# Patient Record
(demographics unavailable — no encounter records)

---

## 2024-12-19 NOTE — ADDENDUM
[FreeTextEntry1] : 44 y/o M, Sami-speaking, w/ no known PMHx who presents as an initial evaluation s/p hospital discharge. Pt was seen at [facility]-ED for dizziness, found to have new pituitary mass on MRI.  He was seen on the day of discharge from the hospital, labs were consistent with hyperprolactinemia and he was started on cabergoline here for follow-up  # Pituitary mass -Noted during evaluation for stroke, followed by MRI of brain which showed a possible 2 cm enhancing cystic mass from the sella turcica - -Prolactin 786, TSH within normal limits Free T4 low, FSH low testosterone low, IGF low, ACTH and cortisol WNL  - - Was started on cabergoline 0.5 mg twice a week has been taking it on Monday and Tuesday advised to space them out to decrease potential side effects, can take them at bedtime, currently tolerating well -Denied any symptoms of hypo or hyper secretion of hormones -Will repeat prolactin FSH LH testosterone TSH free T4 ACTH cortisol IGF-I levels in 2 weeks -Also recommend to get a dedicated MRI of the pituitary gland, -Bedside ophthalmology evaluation noted to be within normal limits will refer for formal visual field exam  #Type 2 diabetes mellitus -Diagnosed during recent hospitalization with A1c 6.5 -Currently on metformin 500 mg twice a day tolerating the same

## 2024-12-19 NOTE — ADDENDUM
[FreeTextEntry1] : 44 y/o M, Turkmen-speaking, w/ no known PMHx who presents as an initial evaluation s/p hospital discharge. Pt was seen at [facility]-ED for dizziness, found to have new pituitary mass on MRI.  He was seen on the day of discharge from the hospital, labs were consistent with hyperprolactinemia and he was started on cabergoline here for follow-up  # Pituitary mass -Noted during evaluation for stroke, followed by MRI of brain which showed a possible 2 cm enhancing cystic mass from the sella turcica - -Prolactin 786, TSH within normal limits Free T4 low, FSH low testosterone low, IGF low, ACTH and cortisol WNL  - - Was started on cabergoline 0.5 mg twice a week has been taking it on Monday and Tuesday advised to space them out to decrease potential side effects, can take them at bedtime, currently tolerating well -Denied any symptoms of hypo or hyper secretion of hormones -Will repeat prolactin FSH LH testosterone TSH free T4 ACTH cortisol IGF-I levels in 2 weeks -Also recommend to get a dedicated MRI of the pituitary gland, -Bedside ophthalmology evaluation noted to be within normal limits will refer for formal visual field exam  #Type 2 diabetes mellitus -Diagnosed during recent hospitalization with A1c 6.5 -Currently on metformin 500 mg twice a day tolerating the same

## 2024-12-19 NOTE — ADDENDUM
[FreeTextEntry1] : 44 y/o M, Greek-speaking, w/ no known PMHx who presents as an initial evaluation s/p hospital discharge. Pt was seen at [facility]-ED for dizziness, found to have new pituitary mass on MRI.  He was seen on the day of discharge from the hospital, labs were consistent with hyperprolactinemia and he was started on cabergoline here for follow-up  # Pituitary mass -Noted during evaluation for stroke, followed by MRI of brain which showed a possible 2 cm enhancing cystic mass from the sella turcica - -Prolactin 786, TSH within normal limits Free T4 low, FSH low testosterone low, IGF low, ACTH and cortisol WNL  - - Was started on cabergoline 0.5 mg twice a week has been taking it on Monday and Tuesday advised to space them out to decrease potential side effects, can take them at bedtime, currently tolerating well -Denied any symptoms of hypo or hyper secretion of hormones -Will repeat prolactin FSH LH testosterone TSH free T4 ACTH cortisol IGF-I levels in 2 weeks -Also recommend to get a dedicated MRI of the pituitary gland, -Bedside ophthalmology evaluation noted to be within normal limits will refer for formal visual field exam  #Type 2 diabetes mellitus -Diagnosed during recent hospitalization with A1c 6.5 -Currently on metformin 500 mg twice a day tolerating the same

## 2024-12-19 NOTE — ASSESSMENT
[FreeTextEntry1] : 46 y/o M, Yi-speaking, w/ no known PMHx who presents as an initial evaluation s/p hospital discharge. Pt was seen at [facility]-ED for dizziness, found to have new pituitary mass on MRI.  #Prolactinoma -Noted on CT head followed by MRI brain described as a possible 2 cm enhancing cystic mass from the sella turcica -MR Head (11/2024): No acute infarct; Possible 2 cm necrotic right pituitary mass; Otherwise essentially unremarkable study. -Prolactin 786, Total Testosterone low at 4.7 @ 1600, TSH WNL however FT4 low at 0.5, Cortisol WNL 17 @ 1600, FSH 1.4, LH 1 (11/2024) -Clinically no signs of hormonal hyposecretion; denies weight loss, nausea, vomiting, low sex drive  Plan:  - Check prolactin, TSH, FT4, ACTH, cortisol, IGF-1, macroprolactin, total testosterone, free MS/EQ testosterone in 2 weeks - C/w cabergoline 0.5 mg 1 tablet twice a week (takes Mon + Tues, advised to space medications out more), started medication on 11/29/24, no SE - Check MRI of the pituitary gland  - Ophthalmology referral for visual field testing  #DMII - A1c 6.5% (11/28/24) - C/w metformin 500 mg BID, reports no SE  #RTC in 3 months

## 2024-12-19 NOTE — ASSESSMENT
[FreeTextEntry1] : 46 y/o M, Estonian-speaking, w/ no known PMHx who presents as an initial evaluation s/p hospital discharge. Pt was seen at [facility]-ED for dizziness, found to have new pituitary mass on MRI.  #Prolactinoma -Noted on CT head followed by MRI brain described as a possible 2 cm enhancing cystic mass from the sella turcica -MR Head (11/2024): No acute infarct; Possible 2 cm necrotic right pituitary mass; Otherwise essentially unremarkable study. -Prolactin 786, Total Testosterone low at 4.7 @ 1600, TSH WNL however FT4 low at 0.5, Cortisol WNL 17 @ 1600, FSH 1.4, LH 1 (11/2024) -Clinically no signs of hormonal hyposecretion; denies weight loss, nausea, vomiting, low sex drive  Plan:  - Check prolactin, TSH, FT4, ACTH, cortisol, IGF-1, macroprolactin, total testosterone, free MS/EQ testosterone in 2 weeks - C/w cabergoline 0.5 mg 1 tablet twice a week (takes Mon + Tues, advised to space medications out more), started medication on 11/29/24, no SE - Check MRI of the pituitary gland  - Ophthalmology referral for visual field testing  #DMII - A1c 6.5% (11/28/24) - C/w metformin 500 mg BID, reports no SE  #RTC in 3 months  Adequate: hears normal conversation without difficulty - - -

## 2024-12-19 NOTE — PHYSICAL EXAM
[Alert] : alert [No Acute Distress] : no acute distress [Normal Sclera/Conjunctiva] : normal sclera/conjunctiva [Normal Outer Ear/Nose] : the ears and nose were normal in appearance [No Respiratory Distress] : no respiratory distress [No Accessory Muscle Use] : no accessory muscle use [No Edema] : no peripheral edema

## 2024-12-19 NOTE — ASSESSMENT
[FreeTextEntry1] : 44 y/o M, Telugu-speaking, w/ no known PMHx who presents as an initial evaluation s/p hospital discharge. Pt was seen at [facility]-ED for dizziness, found to have new pituitary mass on MRI.  #Prolactinoma -Noted on CT head followed by MRI brain described as a possible 2 cm enhancing cystic mass from the sella turcica -MR Head (11/2024): No acute infarct; Possible 2 cm necrotic right pituitary mass; Otherwise essentially unremarkable study. -Prolactin 786, Total Testosterone low at 4.7 @ 1600, TSH WNL however FT4 low at 0.5, Cortisol WNL 17 @ 1600, FSH 1.4, LH 1 (11/2024) -Clinically no signs of hormonal hyposecretion; denies weight loss, nausea, vomiting, low sex drive  Plan:  - Check prolactin, TSH, FT4, ACTH, cortisol, IGF-1, macroprolactin, total testosterone, free MS/EQ testosterone in 2 weeks - C/w cabergoline 0.5 mg 1 tablet twice a week (takes Mon + Tues, advised to space medications out more), started medication on 11/29/24, no SE - Check MRI of the pituitary gland  - Ophthalmology referral for visual field testing  #DMII - A1c 6.5% (11/28/24) - C/w metformin 500 mg BID, reports no SE  #RTC in 3 months

## 2024-12-23 NOTE — HISTORY OF PRESENT ILLNESS
[FreeTextEntry1] : note started in error, see other note dated 12/19 [de-identified] : #Pituitary mass -Noted on CT head followed by MRI brain described as a possible 2 cm enhancing cystic mass from the sella turcica -Prolactin 786, TSH within normal limits Free T4 low, FSH low testosterone pending, IGF low, ACTH well within normal limits which is reassuring cortisol not performed -Clinically no signs of hormonal hyposecretion,denies weight loss, nausea, vomiting, low sex drive hemodynamically stable electrolytes within normal limits -Patient being discharged today recommend recommend obtaining a cortisol level, if cortisol is low may need steroid supplementation before considering thyroid hormone supplementation -Recommend starting cabergoline 0.5 mg 1 tablet twice a week -Will follow-up in 2 to 3 weeks at [facility] with repeat labs - will need dedicated MRI of the pituitary gland (Can be performed outpatient) - will likely need formal visual field testing, no obvious defect noted on bedside exam  MR Head: No acute infarct; Possible 2 cm necrotic right pituitary mass; Otherwise essentially unremarkable study.  11/28/24 A1c 6.5% 11/28/24 Total Testosterone 4.7 @ 1600 FT4 0.5 Cortisol 17 @ 1600 Prolactin 748 FSH 1.4 LH 1

## 2024-12-23 NOTE — HISTORY OF PRESENT ILLNESS
[FreeTextEntry1] : note started in error, see other note dated 12/19 [de-identified] : #Pituitary mass -Noted on CT head followed by MRI brain described as a possible 2 cm enhancing cystic mass from the sella turcica -Prolactin 786, TSH within normal limits Free T4 low, FSH low testosterone pending, IGF low, ACTH well within normal limits which is reassuring cortisol not performed -Clinically no signs of hormonal hyposecretion,denies weight loss, nausea, vomiting, low sex drive hemodynamically stable electrolytes within normal limits -Patient being discharged today recommend recommend obtaining a cortisol level, if cortisol is low may need steroid supplementation before considering thyroid hormone supplementation -Recommend starting cabergoline 0.5 mg 1 tablet twice a week -Will follow-up in 2 to 3 weeks at [facility] with repeat labs - will need dedicated MRI of the pituitary gland (Can be performed outpatient) - will likely need formal visual field testing, no obvious defect noted on bedside exam  MR Head: No acute infarct; Possible 2 cm necrotic right pituitary mass; Otherwise essentially unremarkable study.  11/28/24 A1c 6.5% 11/28/24 Total Testosterone 4.7 @ 1600 FT4 0.5 Cortisol 17 @ 1600 Prolactin 748 FSH 1.4 LH 1

## 2024-12-23 NOTE — HISTORY OF PRESENT ILLNESS
[FreeTextEntry1] : note started in error, see other note dated 12/19 [de-identified] : #Pituitary mass -Noted on CT head followed by MRI brain described as a possible 2 cm enhancing cystic mass from the sella turcica -Prolactin 786, TSH within normal limits Free T4 low, FSH low testosterone pending, IGF low, ACTH well within normal limits which is reassuring cortisol not performed -Clinically no signs of hormonal hyposecretion,denies weight loss, nausea, vomiting, low sex drive hemodynamically stable electrolytes within normal limits -Patient being discharged today recommend recommend obtaining a cortisol level, if cortisol is low may need steroid supplementation before considering thyroid hormone supplementation -Recommend starting cabergoline 0.5 mg 1 tablet twice a week -Will follow-up in 2 to 3 weeks at [facility] with repeat labs - will need dedicated MRI of the pituitary gland (Can be performed outpatient) - will likely need formal visual field testing, no obvious defect noted on bedside exam  MR Head: No acute infarct; Possible 2 cm necrotic right pituitary mass; Otherwise essentially unremarkable study.  11/28/24 A1c 6.5% 11/28/24 Total Testosterone 4.7 @ 1600 FT4 0.5 Cortisol 17 @ 1600 Prolactin 748 FSH 1.4 LH 1

## 2025-01-23 NOTE — HISTORY OF PRESENT ILLNESS
[de-identified] : Patient is a 45 year old male with PMH of pituitary mass and DM2 who presents to establish care. Patient is following with endocrine. No complaints at this time.

## 2025-01-23 NOTE — PHYSICAL EXAM
[No Acute Distress] : no acute distress [Well Nourished] : well nourished [No JVD] : no jugular venous distention [Thyroid Normal, No Nodules] : the thyroid was normal and there were no nodules present [No Respiratory Distress] : no respiratory distress  [Clear to Auscultation] : lungs were clear to auscultation bilaterally [Normal Rate] : normal rate  [Normal S1, S2] : normal S1 and S2 [Soft] : abdomen soft [Non Tender] : non-tender [Non-distended] : non-distended [Normal] : normal gait, coordination grossly intact, no focal deficits and deep tendon reflexes were 2+ and symmetric [Normal Affect] : the affect was normal [Normal Insight/Judgement] : insight and judgment were intact

## 2025-01-23 NOTE — REVIEW OF SYSTEMS
[Dizziness] : dizziness [Negative] : Integumentary [Fever] : no fever [Chills] : no chills [de-identified] : has pituitary mass

## 2025-01-23 NOTE — ASSESSMENT
[FreeTextEntry1] : Patient is a 45 year old male here to establish care.   #DM - on metformin, renewed - following with endocrine  #prolactinoma - follows with endocrine - on capergoline - has appointment with opthalmology in March  #elevated alk phos - 162 - repeat labs, if remains elevated will do ultrasound  #HCM - routine labs ordered - follow up in 6 months

## 2025-03-27 NOTE — HISTORY OF PRESENT ILLNESS
[FreeTextEntry1] : 46 y/o M, Costa Rican-speaking, w/ no known PMHx who presents as an initial evaluation s/p hospital discharge. Pt was seen at [facility]-ED for dizziness, found to have new pituitary mass on MRI. Denies: headache, vision loss, fatigue, constipation, hair loss

## 2025-03-27 NOTE — ASSESSMENT
[FreeTextEntry1] : 44 y/o M, Nepalese-speaking, w/ no known PMHx who presents as an initial evaluation s/p hospital discharge. Pt was seen at [facility]-ED for dizziness, found to have new pituitary mass on MRI.  He was seen on the day of discharge from the hospital, labs were consistent with hyperprolactinemia and he was started on cabergoline here for follow-up  # Pituitary mass -Noted during evaluation for stroke, followed by MRI of brain which showed a possible 2 cm enhancing cystic mass from the sella turcica, repeat MRI JAn 2025 showed decrease in size of pituitary lesion  after starting cabergoline  - -Prolactin 786> 5.2 , TSH within normal limits Free T4 low, FSH low testosterone low, IGF low, ACTH and cortisol WNL  -was on cabergoline consistently twice a week but ran out around 2 weeks ago -Denies any symptoms of hypo or hyper secretion of hormones -Will repeat prolactin FSH LH testosterone TSH free T4 ACTH cortisol IGF-I levels -Will repeat pituitary MRI 6 months from the last 1 -Bedside ophthalmology evaluation noted to be within normal limits followed up with ophthalmology visual field appears to be intact  #Type 2 diabetes mellitus -Diagnosed during recent hospitalization with A1c 6.5> 4.9 ? -Currently on metformin 500 mg twice a day tolerating the same

## 2025-07-24 NOTE — PHYSICAL EXAM
[No Acute Distress] : no acute distress [Well Nourished] : well nourished [No JVD] : no jugular venous distention [No Respiratory Distress] : no respiratory distress  [No Accessory Muscle Use] : no accessory muscle use [No Edema] : there was no peripheral edema [Soft] : abdomen soft [Non Tender] : non-tender

## 2025-07-25 NOTE — HISTORY OF PRESENT ILLNESS
[de-identified] : Patient is a 45 year old male with PMH of pituitary mass and DM2 who presents for 6 month follow up. Pt feels well, reports compliance with his meds.    Estela used [id number] used.

## 2025-07-25 NOTE — ASSESSMENT
[FreeTextEntry1] : Patient is a 45 year old male here to establish care.   #DM - on metformin, renewed - following with endocrine  #Microcytic anemia - will send for anemia work up   #prolactinoma - follows with endocrine - on capergoline - has appointment with opthalmology in March  #elevated alk phos - [id number] - ordered ultrasound  #HCM - labs reviewed  - necessary labs ordered and cabergoline refilled as pt down to 1 pill - follow up in 6 months or prn

## 2025-07-25 NOTE — REVIEW OF SYSTEMS
[Chest Pain] : no chest pain [Shortness Of Breath] : no shortness of breath [Cough] : no cough [Headache] : no headache [Dizziness] : no dizziness

## 2025-07-25 NOTE — HISTORY OF PRESENT ILLNESS
[de-identified] : Patient is a 45 year old male with PMH of pituitary mass and DM2 who presents for 6 month follow up. Pt feels well, reports compliance with his meds.    Estela used [id number] used.